# Patient Record
Sex: MALE | Race: ASIAN | NOT HISPANIC OR LATINO | Employment: FULL TIME | URBAN - METROPOLITAN AREA
[De-identification: names, ages, dates, MRNs, and addresses within clinical notes are randomized per-mention and may not be internally consistent; named-entity substitution may affect disease eponyms.]

---

## 2020-12-02 ENCOUNTER — TELEPHONE (OUTPATIENT)
Dept: NEUROSURGERY | Facility: CLINIC | Age: 45
End: 2020-12-02

## 2020-12-02 NOTE — TELEPHONE ENCOUNTER
Spoke with pt in reference to getting scheduled for an appt for neck and shoulder pain/ left arm and fingers numbness//ns

## 2020-12-08 ENCOUNTER — OFFICE VISIT (OUTPATIENT)
Dept: NEUROSURGERY | Facility: CLINIC | Age: 45
End: 2020-12-08
Payer: COMMERCIAL

## 2020-12-08 VITALS
DIASTOLIC BLOOD PRESSURE: 86 MMHG | RESPIRATION RATE: 16 BRPM | BODY MASS INDEX: 27.6 KG/M2 | HEIGHT: 68 IN | WEIGHT: 182.13 LBS | HEART RATE: 107 BPM | SYSTOLIC BLOOD PRESSURE: 143 MMHG

## 2020-12-08 DIAGNOSIS — R20.2 NUMBNESS AND TINGLING OF LEFT UPPER EXTREMITY: ICD-10-CM

## 2020-12-08 DIAGNOSIS — M54.2 NECK PAIN ON LEFT SIDE: ICD-10-CM

## 2020-12-08 DIAGNOSIS — R20.0 NUMBNESS AND TINGLING OF LEFT UPPER EXTREMITY: ICD-10-CM

## 2020-12-08 DIAGNOSIS — M54.12 RADICULOPATHY, CERVICAL REGION: Primary | ICD-10-CM

## 2020-12-08 PROCEDURE — 99204 PR OFFICE/OUTPT VISIT, NEW, LEVL IV, 45-59 MIN: ICD-10-PCS | Mod: S$GLB,,, | Performed by: PHYSICIAN ASSISTANT

## 2020-12-08 PROCEDURE — 1125F AMNT PAIN NOTED PAIN PRSNT: CPT | Mod: S$GLB,,, | Performed by: PHYSICIAN ASSISTANT

## 2020-12-08 PROCEDURE — 99204 OFFICE O/P NEW MOD 45 MIN: CPT | Mod: S$GLB,,, | Performed by: PHYSICIAN ASSISTANT

## 2020-12-08 PROCEDURE — 1125F PR PAIN SEVERITY QUANTIFIED, PAIN PRESENT: ICD-10-PCS | Mod: S$GLB,,, | Performed by: PHYSICIAN ASSISTANT

## 2020-12-08 PROCEDURE — 99999 PR PBB SHADOW E&M-EST. PATIENT-LVL III: CPT | Mod: PBBFAC,,, | Performed by: PHYSICIAN ASSISTANT

## 2020-12-08 PROCEDURE — 99999 PR PBB SHADOW E&M-EST. PATIENT-LVL III: ICD-10-PCS | Mod: PBBFAC,,, | Performed by: PHYSICIAN ASSISTANT

## 2020-12-08 PROCEDURE — 3008F BODY MASS INDEX DOCD: CPT | Mod: CPTII,S$GLB,, | Performed by: PHYSICIAN ASSISTANT

## 2020-12-08 PROCEDURE — 3008F PR BODY MASS INDEX (BMI) DOCUMENTED: ICD-10-PCS | Mod: CPTII,S$GLB,, | Performed by: PHYSICIAN ASSISTANT

## 2020-12-08 RX ORDER — METHYLPREDNISOLONE 4 MG/1
TABLET ORAL
COMMUNITY
Start: 2020-11-12 | End: 2021-03-22

## 2020-12-08 RX ORDER — TRAMADOL HYDROCHLORIDE 50 MG/1
50 TABLET ORAL EVERY 8 HOURS PRN
COMMUNITY
Start: 2020-11-24 | End: 2021-03-22

## 2020-12-08 RX ORDER — TIZANIDINE 4 MG/1
4 TABLET ORAL NIGHTLY PRN
Qty: 40 TABLET | Refills: 0 | Status: SHIPPED | OUTPATIENT
Start: 2020-12-08 | End: 2021-03-22

## 2020-12-08 NOTE — PROGRESS NOTES
Subjective:      Patient ID: Xiao Styles is a 45 y.o. male.    Chief Complaint: Neck Pain (pt states he's been having neck pain that travels down his left shoudler and arm )    HPI  The patient is here today for evaluation of cervical radiculopathy.   His symptoms started 5-6 weeks ago.  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   One day he was napping and whenever he woke up he started feeling pain on the L side of his neck.  He also reports holding his 10 month old daughter for 2-3 hours. Afterwards he started having pain in his L scapula and trap area. He used OTC pain medications and felt better.  Patient started noticing pain radiating down his left arm.   He was prescribed steroid pack which did not help and was told to see neurology and orthopedics.  He saw orthopedics ([facility]) and was given a steroid shot. This gave him relief for a couple of days.   Patient was told he had rotator cuff and tennis elbow issues.  Any movement in the arm was painful.     At this time he has numbness of L index and long fingers.  He has tingling on the dorsum of his L hand.  Patient still localizes pain on the L side of his neck. He has occasional arm pain especially in the elbow.  Denies issues with the RUE.    No previous neck surgery or injections.  He has been in physical therapy and had dry needling. It did help his symptoms. Feels like his strength is improving.   He is still in physical therapy and will be in therapy for the next 4 weeks.  Patient is currently taking Tramadol as needed.  Rates his pain 2/10.  No recent falls or injuries.    Patient employed as an .      History reviewed. No pertinent past medical history.  History reviewed. No pertinent surgical history.  History reviewed. No pertinent family history.  Social History     Socioeconomic History    Marital status:      Spouse name: Not on file    Number of children: Not on file    Years of education: Not on file    Highest  education level: Not on file   Occupational History    Occupation: [employer]    Social Needs    Financial resource strain: Not on file    Food insecurity     Worry: Not on file     Inability: Not on file    Transportation needs     Medical: Not on file     Non-medical: Not on file   Tobacco Use    Smoking status: Former Smoker   Substance and Sexual Activity    Alcohol use: Never     Frequency: Never    Drug use: Never    Sexual activity: Yes   Lifestyle    Physical activity     Days per week: Not on file     Minutes per session: Not on file    Stress: Only a little   Relationships    Social connections     Talks on phone: Not on file     Gets together: Not on file     Attends [employer] service: Not on file     Active member of club or organization: Not on file     Attends meetings of clubs or organizations: Not on file     Relationship status: Not on file   Other Topics Concern    Not on file   Social History Narrative    Lives with wife and kids.    No pets       Review of patient's allergies indicates:  No Known Allergies  Review of Systems   Constitution: Negative for chills and decreased appetite.   HENT: Negative for congestion.    Eyes: Negative for blurred vision.   Cardiovascular: Negative for chest pain and claudication.   Respiratory: Negative for cough and hemoptysis.    Endocrine: Negative for cold intolerance.   Skin: Negative for color change and rash.   Musculoskeletal: Positive for joint pain (L shoulder), myalgias and neck pain.   Gastrointestinal: Negative for abdominal pain.   Genitourinary: Negative for bladder incontinence.   Neurological: Positive for numbness and paresthesias. Negative for tremors.   Psychiatric/Behavioral: Negative for altered mental status.   Allergic/Immunologic: Negative for environmental allergies.         Objective:     Body mass index is 27.69 kg/m².  Vitals:    12/08/20 1323   BP: (!) 143/86   Pulse: 107   Resp: 16      Neurosurgery Physical  Exam  Ortho/SPM Exam    Nursing note and vitals reviewed  Gen:Oriented to person, place, and time.             Appears stated age   Skin: no rashes or lesions identified   Head:Normocephalic and atraumatic.  Nose: Nose normal.    Eyes: EOM are normal. Pupils are equal, round, and reactive to light.   Neck: Neck supple. No masses or lesions palpated  Cardiovascular: Intact distal pulses.    Abdominal: Soft.   Neurological: Alert and oriented to person, place, and time.  No cranial nerve deficit.  Coordination normal. Normal muscle tone  Psychiatric: Normal mood and affect. Behavior is normal.      Neck:  None Left  Paraspinal tenderness   None  Paraspinal muscle spasms   None  Pain with flexion and extention   WNL  Range of motion shoulders   Neg  Spurling's sign     Motor:   Right Right Left Left  Level Group   5  5 4+ C5 Deltoid   5  5 4+ C6 Bicep   5  5   Wrist extension    5  5  C7 Triceps   5  5   Wrist flexion   5  5  C8    5  5  T1 Interossei      Sensation:  NL Decreased (R/L/BL) Level Sensation    X  C5 Lateral upper arm   X  C6 Thumb and index finger, lat forearm   X  C7 Middle finger   X  C8 Ring and little finger   X  T1 Medial arm      Reflex:  2+  Bicep tendon   2+  Brachioradialis   2+  Triceps tendon   Not present  Frederick's   none  Clonus   neg  Tinel's                 Relevant imaging results reviewed and interpreted by me, discussed with the patient and / or family today.  Assessment:     1. Radiculopathy, cervical region    2. Neck pain on left side    3. Numbness and tingling of left upper extremity      Plan:     Radiculopathy, cervical region  -     X-Ray Cervical Spine AP Lat with Flex Ex; Future; Expected date: 12/08/2020  -     MRI Cervical Spine Without Contrast; Future; Expected date: 12/08/2020    Neck pain on left side  -     X-Ray Cervical Spine AP Lat with Flex Ex; Future; Expected date: 12/08/2020    Numbness and tingling of left upper extremity  -     X-Ray Cervical Spine AP Lat  with Flex Ex; Future; Expected date: 12/08/2020    Other orders  -     tiZANidine (ZANAFLEX) 4 MG tablet; Take 1 tablet (4 mg total) by mouth nightly as needed (muscle spasms).  Dispense: 40 tablet; Refill: 0      The patient will continue with outpatient PT and dry needling.  Rx given for zanaflex to take before bed time as needed.  Will get x-rays of C spine as well as MRI for further evaluation and to rule out nerve compression.  He will follow-up after these studies for discussion and to review tx options.      Sanna Vázquez PA-C  [facility]

## 2020-12-17 ENCOUNTER — TELEPHONE (OUTPATIENT)
Dept: NEUROSURGERY | Facility: CLINIC | Age: 45
End: 2020-12-17

## 2020-12-17 NOTE — TELEPHONE ENCOUNTER
Spoke with pt pt stated his MRI was denied by his insurance I informed pt that we can set up a P2P because he may need some physical therapy to get his MRI approved pt stated he's been doing some PT at [facility] on [place] I informed pt that I will inform Tihazel so that we can see about getting this MRI approved I informed pt that I was going to cancel his MRI appt on today and that we'll keep him updated pt VU all questions was answered.

## 2020-12-28 ENCOUNTER — TELEPHONE (OUTPATIENT)
Dept: NEUROSURGERY | Facility: CLINIC | Age: 45
End: 2020-12-28

## 2020-12-28 NOTE — TELEPHONE ENCOUNTER
Spoke with patient advised Chandler Vázquez has been booked into emergency surgery  w/ MD.    Patient rescheduled for   12/30/2020 3:00 PM Sanna Vázquez PA-C [facility]     Patient verbalized understanding

## 2020-12-30 ENCOUNTER — OFFICE VISIT (OUTPATIENT)
Dept: NEUROSURGERY | Facility: CLINIC | Age: 45
End: 2020-12-30
Payer: COMMERCIAL

## 2020-12-30 ENCOUNTER — HOSPITAL ENCOUNTER (OUTPATIENT)
Dept: RADIOLOGY | Facility: HOSPITAL | Age: 45
Discharge: HOME | End: 2020-12-30
Attending: PHYSICIAN ASSISTANT
Payer: COMMERCIAL

## 2020-12-30 VITALS
BODY MASS INDEX: 27.96 KG/M2 | HEART RATE: 101 BPM | SYSTOLIC BLOOD PRESSURE: 119 MMHG | RESPIRATION RATE: 16 BRPM | WEIGHT: 184.5 LBS | DIASTOLIC BLOOD PRESSURE: 91 MMHG | HEIGHT: 68 IN

## 2020-12-30 DIAGNOSIS — R20.0 NUMBNESS AND TINGLING OF LEFT UPPER EXTREMITY: ICD-10-CM

## 2020-12-30 DIAGNOSIS — R20.2 NUMBNESS AND TINGLING OF LEFT UPPER EXTREMITY: ICD-10-CM

## 2020-12-30 DIAGNOSIS — M54.2 NECK PAIN ON LEFT SIDE: ICD-10-CM

## 2020-12-30 DIAGNOSIS — M54.12 RADICULOPATHY, CERVICAL REGION: Primary | ICD-10-CM

## 2020-12-30 DIAGNOSIS — M54.12 RADICULOPATHY, CERVICAL REGION: ICD-10-CM

## 2020-12-30 PROCEDURE — 3008F BODY MASS INDEX DOCD: CPT | Mod: CPTII,S$GLB,, | Performed by: PHYSICIAN ASSISTANT

## 2020-12-30 PROCEDURE — 1125F PR PAIN SEVERITY QUANTIFIED, PAIN PRESENT: ICD-10-PCS | Mod: S$GLB,,, | Performed by: PHYSICIAN ASSISTANT

## 2020-12-30 PROCEDURE — 99999 PR PBB SHADOW E&M-EST. PATIENT-LVL III: CPT | Mod: PBBFAC,,, | Performed by: PHYSICIAN ASSISTANT

## 2020-12-30 PROCEDURE — 72050 X-RAY EXAM NECK SPINE 4/5VWS: CPT | Mod: TC

## 2020-12-30 PROCEDURE — 3008F PR BODY MASS INDEX (BMI) DOCUMENTED: ICD-10-PCS | Mod: CPTII,S$GLB,, | Performed by: PHYSICIAN ASSISTANT

## 2020-12-30 PROCEDURE — 99999 PR PBB SHADOW E&M-EST. PATIENT-LVL III: ICD-10-PCS | Mod: PBBFAC,,, | Performed by: PHYSICIAN ASSISTANT

## 2020-12-30 PROCEDURE — 99213 OFFICE O/P EST LOW 20 MIN: CPT | Mod: S$GLB,,, | Performed by: PHYSICIAN ASSISTANT

## 2020-12-30 PROCEDURE — 72050 XR CERVICAL SPINE AP LAT WITH FLEX EXTEN: ICD-10-PCS | Mod: 26,,, | Performed by: RADIOLOGY

## 2020-12-30 PROCEDURE — 99213 PR OFFICE/OUTPT VISIT, EST, LEVL III, 20-29 MIN: ICD-10-PCS | Mod: S$GLB,,, | Performed by: PHYSICIAN ASSISTANT

## 2020-12-30 PROCEDURE — 72050 X-RAY EXAM NECK SPINE 4/5VWS: CPT | Mod: 26,,, | Performed by: RADIOLOGY

## 2020-12-30 PROCEDURE — 1125F AMNT PAIN NOTED PAIN PRSNT: CPT | Mod: S$GLB,,, | Performed by: PHYSICIAN ASSISTANT

## 2020-12-30 NOTE — PROGRESS NOTES
Subjective:      Patient ID: Xiao Styles is a 45 y.o. male.    Chief Complaint: Follow-up (pt MRI was denied pt is here to f/u with PA-C to see about getting MRI approved)    HPI  Patient is here today for follow-up regarding cervical radiculopathy.  Since his last visit he did complete physical therapy at [facility] (he has been in therapy for at least 1 month). His last session was on 12/29/20.  He currently takes Tramadol as needed.  Patient still has numbness of L index finger.  Rates his pain 3/10 today.  Patient was prescribed pippa relaxer but this causes drowsiness so he avoids taking it.    Previous notes:  The patient is here today for evaluation of cervical radiculopathy.   His symptoms started 5-6 weeks ago.  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   One day he was napping and whenever he woke up he started feeling pain on the L side of his neck.  He also reports holding his 10 month old daughter for 2-3 hours. Afterwards he started having pain in his L scapula and trap area. He used OTC pain medications and felt better.  Patient started noticing pain radiating down his left arm.   He was prescribed steroid pack which did not help and was told to see neurology and orthopedics.  He saw orthopedics ([facility]) and was given a steroid shot for his L shoulder. This gave him relief for a couple of days.   Patient was told he had rotator cuff and tennis elbow issues.  Any movement in the arm was painful.      At this time he has numbness of L index and long fingers.  He has tingling on the dorsum of his L hand.  Patient still localizes pain on the L side of his neck. He has occasional arm pain especially in the elbow.  Denies issues with the RUE.     No previous neck surgery or injections.  He has been in physical therapy and had dry needling. It did help his symptoms. Feels like his strength is improving.   He is still in physical therapy and will be in therapy for the next 4  weeks.  Patient is currently taking Tramadol as needed.  Rates his pain 2/10.  No recent falls or injuries.     Patient employed as an .      History reviewed. No pertinent past medical history.  History reviewed. No pertinent surgical history.  History reviewed. No pertinent family history.  Social History     Socioeconomic History    Marital status:      Spouse name: Not on file    Number of children: Not on file    Years of education: Not on file    Highest education level: Not on file   Occupational History    Occupation: [employer]    Social Needs    Financial resource strain: Not on file    Food insecurity     Worry: Not on file     Inability: Not on file    Transportation needs     Medical: Not on file     Non-medical: Not on file   Tobacco Use    Smoking status: Former Smoker   Substance and Sexual Activity    Alcohol use: Never     Frequency: Never    Drug use: Never    Sexual activity: Yes   Lifestyle    Physical activity     Days per week: Not on file     Minutes per session: Not on file    Stress: Only a little   Relationships    Social connections     Talks on phone: Not on file     Gets together: Not on file     Attends [employer] service: Not on file     Active member of club or organization: Not on file     Attends meetings of clubs or organizations: Not on file     Relationship status: Not on file   Other Topics Concern    Not on file   Social History Narrative    Lives with wife and kids.    No pets     Medication List with Changes/Refills   Current Medications    METHYLPREDNISOLONE (MEDROL DOSEPACK) 4 MG TABLET    TAKE 6 TABLETS ON DAY 1 AS DIRECTED ON PACKAGE AND DECREASE BY 1 TAB EACH DAY FOR A TOTAL OF 6 DAYS    TIZANIDINE (ZANAFLEX) 4 MG TABLET    Take 1 tablet (4 mg total) by mouth nightly as needed (muscle spasms).    TRAMADOL (ULTRAM) 50 MG TABLET    Take 50 mg by mouth every 8 (eight) hours as needed.     Review of patient's allergies  indicates:  No Known Allergies  Review of Systems   Constitution: Negative for chills and decreased appetite.   HENT: Negative for congestion.    Eyes: Negative for blurred vision.   Cardiovascular: Negative for chest pain and claudication.   Respiratory: Negative for cough and hemoptysis.    Endocrine: Negative for cold intolerance.   Skin: Negative for color change and rash.   Musculoskeletal: Positive for muscle weakness and neck pain. Negative for joint pain.   Gastrointestinal: Negative for abdominal pain.   Genitourinary: Negative for bladder incontinence.   Neurological: Positive for numbness (Left index). Negative for tremors.   Psychiatric/Behavioral: Negative for altered mental status.   Allergic/Immunologic: Negative for environmental allergies.         Objective:     Body mass index is 28.06 kg/m².  Vitals:    12/30/20 1507   BP: (!) 119/91   Pulse: 101   Resp: 16      Neurosurgery Physical Exam  Ortho/SPM Exam    Nursing note and vitals reviewed  Gen:Oriented to person, place, and time.             Appears stated age   Skin: no rashes or lesions identified   Head:Normocephalic and atraumatic.  Nose: Nose normal.    Eyes: EOM are normal. Pupils are equal, round, and reactive to light.   Neck: Neck supple. No masses or lesions palpated  Cardiovascular: Intact distal pulses.    Abdominal: Soft.   Neurological: Alert and oriented to person, place, and time.  No cranial nerve deficit.  Coordination normal. Normal muscle tone  Psychiatric: Normal mood and affect. Behavior is normal.      Neck:  None + Left Paraspinal tenderness   None  Paraspinal muscle spasms   None  Pain with flexion and extention   WNL  Range of motion shoulders   Neg + left Spurling's sign     Motor:   Right Right Left Left  Level Group   5  5 4+ C5 Deltoid   5  5 4+ C6 Bicep   5  5   Wrist extension    5  5  C7 Triceps   5  5   Wrist flexion   5  5  C8    5  5  T1 Interossei      Sensation:  NL Decreased (R/L/BL) Level Sensation    X   C5 Lateral upper arm   X  C6 Thumb and index finger, lat forearm   X  C7 Middle finger   X  C8 Ring and little finger   X  T1 Medial arm      Reflex:  2+  Bicep tendon   2+  Brachioradialis   2+  Triceps tendon   Not present + Left Frederick's   none  Clonus   neg  Tinel's         Will obtain x-rays with MRI.    Relevant imaging results reviewed and interpreted by me, discussed with the patient and / or family today.  Assessment:     1. Radiculopathy, cervical region    2. Numbness and tingling of left upper extremity      Plan:     Radiculopathy, cervical region  -     MRI Cervical Spine Without Contrast; Future; Expected date: 12/30/2020    Numbness and tingling of left upper extremity      Patient has completed conservative treatment for his neck and shoulder.   He finished physical therapy at outpatient clinic for at least 4 weeks.    Will get x-rays of C spine as well as MRI for further evaluation and to rule out nerve compression.  He will follow-up after these studies for discussion and to review tx options.    Sanna Vázquez PA-C  [facility]

## 2021-01-13 ENCOUNTER — TELEPHONE (OUTPATIENT)
Dept: RADIOLOGY | Facility: HOSPITAL | Age: 46
End: 2021-01-13

## 2021-01-14 ENCOUNTER — HOSPITAL ENCOUNTER (OUTPATIENT)
Dept: RADIOLOGY | Facility: HOSPITAL | Age: 46
Discharge: HOME | End: 2021-01-14
Attending: PHYSICIAN ASSISTANT
Payer: COMMERCIAL

## 2021-01-14 DIAGNOSIS — M54.12 RADICULOPATHY, CERVICAL REGION: ICD-10-CM

## 2021-01-14 PROCEDURE — 72141 MRI NECK SPINE W/O DYE: CPT | Mod: TC

## 2021-01-14 PROCEDURE — 72141 MRI NECK SPINE W/O DYE: CPT | Mod: 26,,, | Performed by: RADIOLOGY

## 2021-01-14 PROCEDURE — 72141 MRI CERVICAL SPINE WITHOUT CONTRAST: ICD-10-PCS | Mod: 26,,, | Performed by: RADIOLOGY

## 2021-01-20 ENCOUNTER — OFFICE VISIT (OUTPATIENT)
Dept: NEUROSURGERY | Facility: CLINIC | Age: 46
End: 2021-01-20
Payer: COMMERCIAL

## 2021-01-20 VITALS — HEIGHT: 68 IN | RESPIRATION RATE: 18 BRPM | BODY MASS INDEX: 28.4 KG/M2 | WEIGHT: 187.38 LBS

## 2021-01-20 DIAGNOSIS — M50.30 DDD (DEGENERATIVE DISC DISEASE), CERVICAL: ICD-10-CM

## 2021-01-20 DIAGNOSIS — M54.12 RADICULOPATHY, CERVICAL REGION: Primary | ICD-10-CM

## 2021-01-20 PROCEDURE — 3008F PR BODY MASS INDEX (BMI) DOCUMENTED: ICD-10-PCS | Mod: CPTII,S$GLB,, | Performed by: PHYSICIAN ASSISTANT

## 2021-01-20 PROCEDURE — 3008F BODY MASS INDEX DOCD: CPT | Mod: CPTII,S$GLB,, | Performed by: PHYSICIAN ASSISTANT

## 2021-01-20 PROCEDURE — 99213 PR OFFICE/OUTPT VISIT, EST, LEVL III, 20-29 MIN: ICD-10-PCS | Mod: S$GLB,,, | Performed by: PHYSICIAN ASSISTANT

## 2021-01-20 PROCEDURE — 1126F AMNT PAIN NOTED NONE PRSNT: CPT | Mod: S$GLB,,, | Performed by: PHYSICIAN ASSISTANT

## 2021-01-20 PROCEDURE — 99999 PR PBB SHADOW E&M-EST. PATIENT-LVL III: ICD-10-PCS | Mod: PBBFAC,,, | Performed by: PHYSICIAN ASSISTANT

## 2021-01-20 PROCEDURE — 99213 OFFICE O/P EST LOW 20 MIN: CPT | Mod: S$GLB,,, | Performed by: PHYSICIAN ASSISTANT

## 2021-01-20 PROCEDURE — 99999 PR PBB SHADOW E&M-EST. PATIENT-LVL III: CPT | Mod: PBBFAC,,, | Performed by: PHYSICIAN ASSISTANT

## 2021-01-20 PROCEDURE — 1126F PR PAIN SEVERITY QUANTIFIED, NO PAIN PRESENT: ICD-10-PCS | Mod: S$GLB,,, | Performed by: PHYSICIAN ASSISTANT

## 2021-02-22 ENCOUNTER — PATIENT MESSAGE (OUTPATIENT)
Dept: NEUROSURGERY | Facility: CLINIC | Age: 46
End: 2021-02-22

## 2021-02-23 ENCOUNTER — TELEPHONE (OUTPATIENT)
Dept: PHYSICAL MEDICINE AND REHAB | Facility: CLINIC | Age: 46
End: 2021-02-23

## 2021-02-23 ENCOUNTER — TELEPHONE (OUTPATIENT)
Dept: NEUROSURGERY | Facility: CLINIC | Age: 46
End: 2021-02-23

## 2021-02-23 DIAGNOSIS — M54.12 RADICULOPATHY, CERVICAL REGION: Primary | ICD-10-CM

## 2021-03-19 ENCOUNTER — TELEPHONE (OUTPATIENT)
Dept: NEUROSURGERY | Facility: CLINIC | Age: 46
End: 2021-03-19

## 2021-03-22 ENCOUNTER — OFFICE VISIT (OUTPATIENT)
Dept: PHYSICAL MEDICINE AND REHAB | Facility: CLINIC | Age: 46
End: 2021-03-22
Payer: COMMERCIAL

## 2021-03-22 VITALS
DIASTOLIC BLOOD PRESSURE: 91 MMHG | BODY MASS INDEX: 28.34 KG/M2 | HEIGHT: 68 IN | HEART RATE: 90 BPM | WEIGHT: 187 LBS | SYSTOLIC BLOOD PRESSURE: 133 MMHG

## 2021-03-22 DIAGNOSIS — M54.12 RADICULOPATHY, CERVICAL REGION: ICD-10-CM

## 2021-03-22 PROCEDURE — 1126F PR PAIN SEVERITY QUANTIFIED, NO PAIN PRESENT: ICD-10-PCS | Mod: S$GLB,,, | Performed by: PHYSICAL MEDICINE & REHABILITATION

## 2021-03-22 PROCEDURE — 95910 PR NERVE CONDUCTION STUDY; 7-8 STUDIES: ICD-10-PCS | Mod: S$GLB,,, | Performed by: PHYSICAL MEDICINE & REHABILITATION

## 2021-03-22 PROCEDURE — 3008F PR BODY MASS INDEX (BMI) DOCUMENTED: ICD-10-PCS | Mod: CPTII,S$GLB,, | Performed by: PHYSICAL MEDICINE & REHABILITATION

## 2021-03-22 PROCEDURE — 99204 PR OFFICE/OUTPT VISIT, NEW, LEVL IV, 45-59 MIN: ICD-10-PCS | Mod: 25,S$GLB,, | Performed by: PHYSICAL MEDICINE & REHABILITATION

## 2021-03-22 PROCEDURE — 99999 PR PBB SHADOW E&M-EST. PATIENT-LVL III: ICD-10-PCS | Mod: PBBFAC,,, | Performed by: PHYSICAL MEDICINE & REHABILITATION

## 2021-03-22 PROCEDURE — 99204 OFFICE O/P NEW MOD 45 MIN: CPT | Mod: 25,S$GLB,, | Performed by: PHYSICAL MEDICINE & REHABILITATION

## 2021-03-22 PROCEDURE — 99999 PR PBB SHADOW E&M-EST. PATIENT-LVL III: CPT | Mod: PBBFAC,,, | Performed by: PHYSICAL MEDICINE & REHABILITATION

## 2021-03-22 PROCEDURE — 1126F AMNT PAIN NOTED NONE PRSNT: CPT | Mod: S$GLB,,, | Performed by: PHYSICAL MEDICINE & REHABILITATION

## 2021-03-22 PROCEDURE — 95886 PR EMG COMPLETE, W/ NERVE CONDUCTION STUDIES, 5+ MUSCLES: ICD-10-PCS | Mod: S$GLB,,, | Performed by: PHYSICAL MEDICINE & REHABILITATION

## 2021-03-22 PROCEDURE — 95910 NRV CNDJ TEST 7-8 STUDIES: CPT | Mod: S$GLB,,, | Performed by: PHYSICAL MEDICINE & REHABILITATION

## 2021-03-22 PROCEDURE — 95886 MUSC TEST DONE W/N TEST COMP: CPT | Mod: S$GLB,,, | Performed by: PHYSICAL MEDICINE & REHABILITATION

## 2021-03-22 PROCEDURE — 3008F BODY MASS INDEX DOCD: CPT | Mod: CPTII,S$GLB,, | Performed by: PHYSICAL MEDICINE & REHABILITATION

## 2021-03-22 RX ORDER — METFORMIN HYDROCHLORIDE 500 MG/1
500 TABLET, EXTENDED RELEASE ORAL EVERY MORNING
COMMUNITY
Start: 2021-03-07

## 2021-03-30 ENCOUNTER — TELEPHONE (OUTPATIENT)
Dept: NEUROSURGERY | Facility: CLINIC | Age: 46
End: 2021-03-30

## 2021-03-30 ENCOUNTER — OFFICE VISIT (OUTPATIENT)
Dept: NEUROSURGERY | Facility: CLINIC | Age: 46
End: 2021-03-30
Payer: COMMERCIAL

## 2021-03-30 VITALS
HEIGHT: 68 IN | HEART RATE: 80 BPM | WEIGHT: 188.25 LBS | DIASTOLIC BLOOD PRESSURE: 87 MMHG | BODY MASS INDEX: 28.53 KG/M2 | SYSTOLIC BLOOD PRESSURE: 125 MMHG

## 2021-03-30 DIAGNOSIS — R20.2 NUMBNESS AND TINGLING OF LEFT UPPER EXTREMITY: ICD-10-CM

## 2021-03-30 DIAGNOSIS — R20.0 NUMBNESS AND TINGLING OF LEFT UPPER EXTREMITY: ICD-10-CM

## 2021-03-30 DIAGNOSIS — M54.12 RADICULOPATHY, CERVICAL REGION: Primary | ICD-10-CM

## 2021-03-30 PROCEDURE — 99213 PR OFFICE/OUTPT VISIT, EST, LEVL III, 20-29 MIN: ICD-10-PCS | Mod: S$GLB,,, | Performed by: PHYSICIAN ASSISTANT

## 2021-03-30 PROCEDURE — 99213 OFFICE O/P EST LOW 20 MIN: CPT | Mod: S$GLB,,, | Performed by: PHYSICIAN ASSISTANT

## 2021-03-30 PROCEDURE — 1126F AMNT PAIN NOTED NONE PRSNT: CPT | Mod: S$GLB,,, | Performed by: PHYSICIAN ASSISTANT

## 2021-03-30 PROCEDURE — 99999 PR PBB SHADOW E&M-EST. PATIENT-LVL III: ICD-10-PCS | Mod: PBBFAC,,, | Performed by: PHYSICIAN ASSISTANT

## 2021-03-30 PROCEDURE — 3008F BODY MASS INDEX DOCD: CPT | Mod: CPTII,S$GLB,, | Performed by: PHYSICIAN ASSISTANT

## 2021-03-30 PROCEDURE — 3008F PR BODY MASS INDEX (BMI) DOCUMENTED: ICD-10-PCS | Mod: CPTII,S$GLB,, | Performed by: PHYSICIAN ASSISTANT

## 2021-03-30 PROCEDURE — 99999 PR PBB SHADOW E&M-EST. PATIENT-LVL III: CPT | Mod: PBBFAC,,, | Performed by: PHYSICIAN ASSISTANT

## 2021-03-30 PROCEDURE — 1126F PR PAIN SEVERITY QUANTIFIED, NO PAIN PRESENT: ICD-10-PCS | Mod: S$GLB,,, | Performed by: PHYSICIAN ASSISTANT

## 2021-03-30 RX ORDER — TIZANIDINE 4 MG/1
4 TABLET ORAL EVERY 8 HOURS PRN
Qty: 60 TABLET | Refills: 0 | Status: SHIPPED | OUTPATIENT
Start: 2021-03-30

## 2021-03-30 RX ORDER — METHYLPREDNISOLONE 4 MG/1
TABLET ORAL
Qty: 21 TABLET | Refills: 0 | Status: SHIPPED | OUTPATIENT
Start: 2021-03-30 | End: 2021-04-20

## 2021-03-31 ENCOUNTER — PATIENT MESSAGE (OUTPATIENT)
Dept: NEUROSURGERY | Facility: CLINIC | Age: 46
End: 2021-03-31

## 2021-04-12 DIAGNOSIS — M54.12 RADICULOPATHY, CERVICAL REGION: ICD-10-CM

## 2021-04-12 DIAGNOSIS — R20.0 NUMBNESS AND TINGLING OF LEFT UPPER EXTREMITY: Primary | ICD-10-CM

## 2021-04-12 DIAGNOSIS — R20.2 NUMBNESS AND TINGLING OF LEFT UPPER EXTREMITY: Primary | ICD-10-CM

## 2021-04-13 ENCOUNTER — PATIENT MESSAGE (OUTPATIENT)
Dept: NEUROSURGERY | Facility: CLINIC | Age: 46
End: 2021-04-13

## 2021-04-22 ENCOUNTER — PATIENT MESSAGE (OUTPATIENT)
Dept: NEUROSURGERY | Facility: CLINIC | Age: 46
End: 2021-04-22

## 2021-08-26 ENCOUNTER — PATIENT MESSAGE (OUTPATIENT)
Dept: NEUROSURGERY | Facility: CLINIC | Age: 46
End: 2021-08-26

## 2021-09-17 ENCOUNTER — PATIENT MESSAGE (OUTPATIENT)
Dept: NEUROSURGERY | Facility: CLINIC | Age: 46
End: 2021-09-17

## 2021-09-17 ENCOUNTER — OFFICE VISIT (OUTPATIENT)
Dept: NEUROSURGERY | Facility: CLINIC | Age: 46
End: 2021-09-17
Payer: COMMERCIAL

## 2021-09-17 VITALS
RESPIRATION RATE: 18 BRPM | BODY MASS INDEX: 29.6 KG/M2 | SYSTOLIC BLOOD PRESSURE: 122 MMHG | DIASTOLIC BLOOD PRESSURE: 85 MMHG | WEIGHT: 195.31 LBS | HEART RATE: 78 BPM | HEIGHT: 68 IN

## 2021-09-17 DIAGNOSIS — M54.12 RADICULOPATHY, CERVICAL REGION: Primary | ICD-10-CM

## 2021-09-17 DIAGNOSIS — M50.30 DDD (DEGENERATIVE DISC DISEASE), CERVICAL: ICD-10-CM

## 2021-09-17 DIAGNOSIS — M25.511 ACUTE PAIN OF RIGHT SHOULDER: ICD-10-CM

## 2021-09-17 PROCEDURE — 1159F PR MEDICATION LIST DOCUMENTED IN MEDICAL RECORD: ICD-10-PCS | Mod: CPTII,S$GLB,, | Performed by: PHYSICIAN ASSISTANT

## 2021-09-17 PROCEDURE — 3079F PR MOST RECENT DIASTOLIC BLOOD PRESSURE 80-89 MM HG: ICD-10-PCS | Mod: CPTII,S$GLB,, | Performed by: PHYSICIAN ASSISTANT

## 2021-09-17 PROCEDURE — 1159F MED LIST DOCD IN RCRD: CPT | Mod: CPTII,S$GLB,, | Performed by: PHYSICIAN ASSISTANT

## 2021-09-17 PROCEDURE — 99213 PR OFFICE/OUTPT VISIT, EST, LEVL III, 20-29 MIN: ICD-10-PCS | Mod: S$GLB,,, | Performed by: PHYSICIAN ASSISTANT

## 2021-09-17 PROCEDURE — 3008F PR BODY MASS INDEX (BMI) DOCUMENTED: ICD-10-PCS | Mod: CPTII,S$GLB,, | Performed by: PHYSICIAN ASSISTANT

## 2021-09-17 PROCEDURE — 3008F BODY MASS INDEX DOCD: CPT | Mod: CPTII,S$GLB,, | Performed by: PHYSICIAN ASSISTANT

## 2021-09-17 PROCEDURE — 3079F DIAST BP 80-89 MM HG: CPT | Mod: CPTII,S$GLB,, | Performed by: PHYSICIAN ASSISTANT

## 2021-09-17 PROCEDURE — 3074F SYST BP LT 130 MM HG: CPT | Mod: CPTII,S$GLB,, | Performed by: PHYSICIAN ASSISTANT

## 2021-09-17 PROCEDURE — 99999 PR PBB SHADOW E&M-EST. PATIENT-LVL III: CPT | Mod: PBBFAC,,, | Performed by: PHYSICIAN ASSISTANT

## 2021-09-17 PROCEDURE — 99213 OFFICE O/P EST LOW 20 MIN: CPT | Mod: S$GLB,,, | Performed by: PHYSICIAN ASSISTANT

## 2021-09-17 PROCEDURE — 3074F PR MOST RECENT SYSTOLIC BLOOD PRESSURE < 130 MM HG: ICD-10-PCS | Mod: CPTII,S$GLB,, | Performed by: PHYSICIAN ASSISTANT

## 2021-09-17 PROCEDURE — 99999 PR PBB SHADOW E&M-EST. PATIENT-LVL III: ICD-10-PCS | Mod: PBBFAC,,, | Performed by: PHYSICIAN ASSISTANT

## 2021-09-17 RX ORDER — DICLOFENAC SODIUM 75 MG/1
75 TABLET, DELAYED RELEASE ORAL 2 TIMES DAILY
Qty: 60 TABLET | Refills: 2 | Status: SHIPPED | OUTPATIENT
Start: 2021-09-17